# Patient Record
Sex: MALE | Race: WHITE | NOT HISPANIC OR LATINO | URBAN - NONMETROPOLITAN AREA
[De-identification: names, ages, dates, MRNs, and addresses within clinical notes are randomized per-mention and may not be internally consistent; named-entity substitution may affect disease eponyms.]

---

## 2020-01-02 ENCOUNTER — IMPORTED ENCOUNTER (OUTPATIENT)
Dept: URBAN - NONMETROPOLITAN AREA CLINIC 1 | Facility: CLINIC | Age: 80
End: 2020-01-02

## 2020-01-02 PROBLEM — H52.4: Noted: 2020-01-02

## 2020-01-02 PROBLEM — E11.9: Noted: 2020-01-02

## 2020-01-02 PROBLEM — H40.1134: Noted: 2020-01-02

## 2020-01-02 PROBLEM — Z96.1: Noted: 2020-01-02

## 2020-01-02 PROCEDURE — S0620 ROUTINE OPHTHALMOLOGICAL EXA: HCPCS

## 2020-01-02 NOTE — PATIENT DISCUSSION
Presbyopia OUDiscussed refractive status in detail with patient. New glasses Rx given today. Continue to monitor. IDDM sans Retinopathy Discussed diagnosis with patient. Discussed the risk of diabetic damage of the retina with potential vision loss and the importance of routine follow-up. Emphasized strict blood sugar control. Continue to monitor. P/C IOL OUDiscussed diagnosis in detail with patient. Both intraocular implants in place and stable. Continue to monitor. POAG Discussed diagnosis with patient. Negative family history of disease. Discussed the chronic progressive nature of this disease and various treatment options. Importance of good compliance with medications was emphasized. IOP at 15 OU. Continue Betimol QAM OS. RTC A/S with [employer]

## 2021-09-22 NOTE — PROCEDURE NOTE: CLINICAL
PROCEDURE NOTE: Pneumatic Retinopexy OD. Anesthesia: Subconjunctival. Prep: Betadine Flush. Prior to procedure, risks/benefits/alternatives discussed including infection, loss of vision, hemorrhage, cataract, glaucoma, retinal tears or detachment and patient wished to proceed. The patient was brought to the treatment room where local anesthesia was achieved by a 5 cc injection of 0.75% Marcaine in a 50/50 mixture with 2% Xylocaine and was given in a retrobulbar fashion. After adequate anesthesia, the areas about the eye with the retinal detachment were prepped and draped in the usual sterile fashion. A lid speculum was placed into the operative and eye and removed prior to the end of the procedure. A drop of topical 5% iodine solution was applied to the globe. Cryo retinopexy WAS performed at the identified holes or tears. The patient was placed on their side and an area *mm posterior to the corneoscleral limbus was marked in the IN quadrant. A 0.4cc injection of C3F8 was made under direct visualization into the vitreous cavity. After the injection, the patient was rotated to their opposite side, and the needle was withdrawn from the vitreous cavity. Indirect ophthalmoscopy revealed the central retinal artery to be hypoperfused indicating acute glaucoma/pressure elevation. A therapeutic paracentesis was performed and 0.4CC of fluid was removed. The intraocular pressure following paracentesis was 57. The intraocular pressure 10 minutes after the procedure was 11mmHg. (The patient was given Diamox 500 mg, Alphagan, Xalatan, Azopt, Lumigan, Cosopt, Combigan, Cyclogyl, atropine, Timoptic-XE ½% one drop). Erythromycin ointment was placed into the eye and a sterile patch was placed over the eye. The patient tolerated the procedure well and left the treatment room in stable condition. There were no complications. Post-op instructions given. The patient was then discharged home on Zymar/Zymaxid/Besivance, q.i.d. and Pred Forte q.i.d. Patient given office phone number/answering service number and advised to call immediately should there be an increase in floaters or redness, loss of vision or pain, or should they have any other questions or concerns. Ming Michaels PROCEDURE NOTE: Cryotherapy for RD OD. Anesthesia: Proparacaine 0.5%. Prep: Proparacaine 0.5%. Prior to procedure, risks/benefits/alternatives discussed including loss of vision, hemorrhage, cataract, need for laser and/or surgery and patient wished to proceed. Location of treatment: TEMP. Number of applications: 6 (3 AT 9, 3 AT 10. Patient tolerated procedure well. There were no complications. Post op instructions given. Patient given office phone number/answering service number and advised to call immediately should there be loss of vision or pain, or should they have any other questions or concerns. Ming Michaels

## 2021-09-22 NOTE — PATIENT DISCUSSION
9/22/21: MULTIPLE BREAKS WITHIN 1 QUADRANT. Recommend PNEUMATIC RETINOPEXY followed by LASER on follow up when retina is attached to try and reduce the risk of redetachment. Discussed benefits, alternatives, and risks of surgery including (but not limited to) cataract (if patient has natural lens), glaucoma, infection, bleeding, redetachment, optic neuropathy, loss of vision, blindness, and loss of eye. Patient understands more than one operation may be needed to repair retinal detachment.

## 2021-09-24 NOTE — PATIENT DISCUSSION
Cataract DOES NOT appear visually significant. Home Suture Removal Text: Patient was provided instructions on removing sutures and will remove their sutures at home.  If they have any questions or difficulties they will call the office.

## 2021-10-01 NOTE — PATIENT DISCUSSION
10/1/21: RETINA IS FLAT S/P REPAIR. FLOATERS PERSISTENT, DISCUSSED THEY MAY REMAIN FOR SEVERAL WKS/MO.  STOP ABX, TAPER PREDNISOLONE.

## 2022-01-05 NOTE — PATIENT DISCUSSION
1/5/22: Explained that floaters will likely decrease in severity with age and ongoing vitreous liquefaction. Patient understands condition, prognosis and need for follow up care. VGIVEN THE HISTORY OF RD, PIGMENTED VITREOUS MAY TAKE LONGER TO CLEAR AND MAY REQUIRE SX. PT IS AWARE AND UNDERSTAND HIS CONDITION.

## 2022-01-27 NOTE — PATIENT DISCUSSION
9/22/21: MULTIPLE BREAKS WITHIN 1 QUADRANT. Recommend PNEUMATIC RETINOPEXY followed by LASER on follow up when retina is attached to try and reduce the risk of redetachment. Discussed benefits, alternatives, and risks of surgery including (but not limited to) cataract (if patient has natural lens), glaucoma, infection, bleeding, redetachment, optic neuropathy, loss of vision, blindness, and loss of eye. Patient understands more than one operation may be needed to repair retinal detachment. Gastroenterology

## 2022-01-27 NOTE — PATIENT DISCUSSION
1/27/22: RECURRENT FLUID TODAY WITH EARLY PVR (PVR A) INFERIORLY. GIVEN THIS, RECOMMEND PPV. PT LIVES IN [facility] [place] NOW. WE'LL REFER TO DR Jose Bai FOR EVAL AND MANAGEMENT. SPOKE W DR Jose Bai, WILL SEE PT TOMORROW.

## 2022-04-10 ASSESSMENT — TONOMETRY
OS_IOP_MMHG: 15
OD_IOP_MMHG: 15

## 2022-04-10 ASSESSMENT — VISUAL ACUITY
OS_CC: 20/40-
OD_CC: 20/60

## 2023-11-20 ENCOUNTER — EMERGENCY VISIT (OUTPATIENT)
Dept: URBAN - NONMETROPOLITAN AREA CLINIC 1 | Facility: CLINIC | Age: 83
End: 2023-11-20

## 2023-11-20 DIAGNOSIS — H35.3231: ICD-10-CM

## 2023-11-20 PROCEDURE — 99214 OFFICE O/P EST MOD 30 MIN: CPT

## 2023-11-20 PROCEDURE — 92134 CPTRZ OPH DX IMG PST SGM RTA: CPT

## 2023-11-20 ASSESSMENT — VISUAL ACUITY
OS_CC: 20/60-1
OU_CC: 20/40

## 2023-11-20 ASSESSMENT — TONOMETRY
OS_IOP_MMHG: 14
OD_IOP_MMHG: 14

## 2024-07-15 ENCOUNTER — CONTACT LENSES/GLASSES VISIT (OUTPATIENT)
Dept: URBAN - NONMETROPOLITAN AREA CLINIC 1 | Facility: CLINIC | Age: 84
End: 2024-07-15

## 2024-07-15 DIAGNOSIS — H52.4: ICD-10-CM

## 2024-07-15 PROCEDURE — 92015 DETERMINE REFRACTIVE STATE: CPT

## 2024-07-15 ASSESSMENT — VISUAL ACUITY
OD_CC: 20/150-1
OU_CC: 20/50-1
OS_CC: 20/60